# Patient Record
Sex: FEMALE | Race: WHITE
[De-identification: names, ages, dates, MRNs, and addresses within clinical notes are randomized per-mention and may not be internally consistent; named-entity substitution may affect disease eponyms.]

---
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
## 2019-06-17 ENCOUNTER — HOSPITAL ENCOUNTER (OUTPATIENT)
Dept: HOSPITAL 62 - LC | Age: 19
Discharge: HOME | End: 2019-06-17
Attending: OBSTETRICS & GYNECOLOGY
Payer: COMMERCIAL

## 2019-06-17 ENCOUNTER — HOSPITAL ENCOUNTER (EMERGENCY)
Dept: HOSPITAL 62 - ER | Age: 19
Discharge: STILL ADMITTED OR ADMINISTRATIVE | End: 2019-06-17
Payer: COMMERCIAL

## 2019-06-17 VITALS — SYSTOLIC BLOOD PRESSURE: 116 MMHG | DIASTOLIC BLOOD PRESSURE: 54 MMHG

## 2019-06-17 DIAGNOSIS — M54.5: ICD-10-CM

## 2019-06-17 DIAGNOSIS — O99.283: ICD-10-CM

## 2019-06-17 DIAGNOSIS — O21.2: Primary | ICD-10-CM

## 2019-06-17 DIAGNOSIS — Z3A.39: ICD-10-CM

## 2019-06-17 DIAGNOSIS — R10.9: ICD-10-CM

## 2019-06-17 DIAGNOSIS — O47.1: Primary | ICD-10-CM

## 2019-06-17 DIAGNOSIS — E86.0: ICD-10-CM

## 2019-06-17 DIAGNOSIS — O26.893: ICD-10-CM

## 2019-06-17 DIAGNOSIS — O99.89: ICD-10-CM

## 2019-06-17 DIAGNOSIS — Z3A.38: ICD-10-CM

## 2019-06-17 LAB
ADD MANUAL DIFF: NO
ALBUMIN SERPL-MCNC: 3.8 G/DL (ref 3.5–5)
ALP SERPL-CCNC: 125 U/L (ref 38–126)
ALT SERPL-CCNC: 22 U/L (ref 9–52)
ANION GAP SERPL CALC-SCNC: 10 MMOL/L (ref 5–19)
APPEARANCE UR: (no result)
APPEARANCE UR: (no result)
APTT PPP: YELLOW S
APTT PPP: YELLOW S
AST SERPL-CCNC: 25 U/L (ref 14–36)
BARBITURATES UR QL SCN: NEGATIVE
BASOPHILS # BLD AUTO: 0 10^3/UL (ref 0–0.2)
BASOPHILS NFR BLD AUTO: 0.3 % (ref 0–2)
BILIRUB DIRECT SERPL-MCNC: 0.2 MG/DL (ref 0–0.4)
BILIRUB SERPL-MCNC: 0.5 MG/DL (ref 0.2–1.3)
BILIRUB UR QL STRIP: NEGATIVE
BILIRUB UR QL STRIP: NEGATIVE
BUN SERPL-MCNC: 11 MG/DL (ref 7–20)
CALCIUM: 9.4 MG/DL (ref 8.4–10.2)
CHLORIDE SERPL-SCNC: 105 MMOL/L (ref 98–107)
CO2 SERPL-SCNC: 21 MMOL/L (ref 22–30)
EOSINOPHIL # BLD AUTO: 0 10^3/UL (ref 0–0.6)
EOSINOPHIL NFR BLD AUTO: 0.1 % (ref 0–6)
ERYTHROCYTE [DISTWIDTH] IN BLOOD BY AUTOMATED COUNT: 17 % (ref 11.5–14)
GLUCOSE SERPL-MCNC: 64 MG/DL (ref 75–110)
GLUCOSE UR STRIP-MCNC: NEGATIVE MG/DL
GLUCOSE UR STRIP-MCNC: NEGATIVE MG/DL
HCT VFR BLD CALC: 26.9 % (ref 36–47)
HGB BLD-MCNC: 8.5 G/DL (ref 12–15.5)
KETONES UR STRIP-MCNC: 80 MG/DL
KETONES UR STRIP-MCNC: 80 MG/DL
LIPASE SERPL-CCNC: 108.2 U/L (ref 23–300)
LYMPHOCYTES # BLD AUTO: 1 10^3/UL (ref 0.5–4.7)
LYMPHOCYTES NFR BLD AUTO: 8.4 % (ref 13–45)
MCH RBC QN AUTO: 21.7 PG (ref 27–33.4)
MCHC RBC AUTO-ENTMCNC: 31.5 G/DL (ref 32–36)
MCV RBC AUTO: 69 FL (ref 80–97)
METHADONE UR QL SCN: NEGATIVE
MONOCYTES # BLD AUTO: 0.7 10^3/UL (ref 0.1–1.4)
MONOCYTES NFR BLD AUTO: 6 % (ref 3–13)
NEUTROPHILS # BLD AUTO: 9.7 10^3/UL (ref 1.7–8.2)
NEUTS SEG NFR BLD AUTO: 85.2 % (ref 42–78)
NITRITE UR QL STRIP: NEGATIVE
NITRITE UR QL STRIP: NEGATIVE
PCP UR QL SCN: NEGATIVE
PH UR STRIP: 5 [PH] (ref 5–9)
PH UR STRIP: 5 [PH] (ref 5–9)
PLATELET # BLD: 335 10^3/UL (ref 150–450)
POTASSIUM SERPL-SCNC: 4.1 MMOL/L (ref 3.6–5)
PROT SERPL-MCNC: 6.7 G/DL (ref 6.3–8.2)
PROT UR STRIP-MCNC: NEGATIVE MG/DL
PROT UR STRIP-MCNC: NEGATIVE MG/DL
RBC # BLD AUTO: 3.91 10^6/UL (ref 3.72–5.28)
SODIUM SERPL-SCNC: 135.9 MMOL/L (ref 137–145)
SP GR UR STRIP: 1.02
SP GR UR STRIP: 1.03
TOTAL CELLS COUNTED % (AUTO): 100 %
URINE AMPHETAMINES SCREEN: NEGATIVE
URINE BENZODIAZEPINES SCREEN: NEGATIVE
URINE COCAINE SCREEN: NEGATIVE
URINE MARIJUANA (THC) SCREEN: NEGATIVE
UROBILINOGEN UR-MCNC: NEGATIVE MG/DL (ref ?–2)
UROBILINOGEN UR-MCNC: NEGATIVE MG/DL (ref ?–2)
WBC # BLD AUTO: 11.4 10^3/UL (ref 4–10.5)

## 2019-06-17 PROCEDURE — 96374 THER/PROPH/DIAG INJ IV PUSH: CPT

## 2019-06-17 PROCEDURE — 36415 COLL VENOUS BLD VENIPUNCTURE: CPT

## 2019-06-17 PROCEDURE — 81001 URINALYSIS AUTO W/SCOPE: CPT

## 2019-06-17 PROCEDURE — 85025 COMPLETE CBC W/AUTO DIFF WBC: CPT

## 2019-06-17 PROCEDURE — 80053 COMPREHEN METABOLIC PANEL: CPT

## 2019-06-17 PROCEDURE — 99284 EMERGENCY DEPT VISIT MOD MDM: CPT

## 2019-06-17 PROCEDURE — 83690 ASSAY OF LIPASE: CPT

## 2019-06-17 PROCEDURE — 4A1HXCZ MONITORING OF PRODUCTS OF CONCEPTION, CARDIAC RATE, EXTERNAL APPROACH: ICD-10-PCS | Performed by: OBSTETRICS & GYNECOLOGY

## 2019-06-17 PROCEDURE — 59025 FETAL NON-STRESS TEST: CPT

## 2019-06-17 PROCEDURE — 80307 DRUG TEST PRSMV CHEM ANLYZR: CPT

## 2019-06-17 PROCEDURE — 96361 HYDRATE IV INFUSION ADD-ON: CPT

## 2019-06-17 NOTE — ER DOCUMENT REPORT
ED General





- General


Chief Complaint: Vomiting


Stated Complaint: POSSIBLE DEHYDRATION


Time Seen by Provider: 06/17/19 12:50


Primary Care Provider: 


DELFINO BORJA MD [Primary Care Provider] - Follow up as needed


TRAVEL OUTSIDE OF THE [place] IN LAST 30 DAYS: No





- HPI


Notes: 





19-year-old female to the emergency department with complaints of nausea and 

vomiting that started this morning and low back pain and abdominal pain 

consistent with contractions that began last night.  She states that she is 39 

weeks pregnant with her second child.  States the contractions, every 10 minutes

and last anywhere from 45 to 60 seconds.  Denies any fevers, chills, diarrhea, 

headache, chest pain, shortness of breath.  [facility].  She has not had any bloody discharge since her 

symptoms began.





- Related Data


Allergies/Adverse Reactions: 


                                        





No Known Allergies Allergy (Verified 06/17/19 14:54)


   











Past Medical History





- General


Information source: Patient





- Social History


Smoking Status: Never Smoker


Frequency of alcohol use: None


Drug Abuse: None


Family History: Reviewed & Not Pertinent


Patient has suicidal ideation: No


Patient has homicidal ideation: No


Renal/ Medical History: Denies: Hx Peritoneal Dialysis


Past Surgical History: Reports: Hx Oral Surgery - wisdom teeth





Review of Systems





- Review of Systems


Constitutional: denies: Chills, Fever


EENT: No symptoms reported


Cardiovascular: denies: Chest pain, Palpitations, Heart racing, Syncope, 

Dizziness, Lightheaded


Respiratory: denies: Cough, Short of breath


Gastrointestinal: Abdominal pain, Nausea, Vomiting.  denies: Diarrhea


Musculoskeletal: No symptoms reported


Skin: No symptoms reported


Neurological/Psychological: denies: Weakness, Numbness


-: Yes All other systems reviewed and negative





Physical Exam





- Vital signs


Vitals: 


                                        











Temp Pulse Resp BP Pulse Ox


 


 98.3 F   103 H  18   116/54 L  100 


 


 06/17/19 12:15  06/17/19 12:15  06/17/19 12:15  06/17/19 12:15  06/17/19 12:15











Interpretation: Tachycardic





- General


General appearance: Appears well


In distress: None





- HEENT


Head: Normocephalic, Atraumatic





- Respiratory


Respiratory status: No respiratory distress


Chest status: Nontender


Breath sounds: Normal


Chest palpation: Normal





- Cardiovascular


Rhythm: Regular


Heart sounds: Normal auscultation


Murmur: No





- Abdominal


Inspection: Gravid female - Gravid abdomen with fundus well above the umbilicus 

consistent with 39 weeks.  Not currently teresa.  Reports last contraction 

while in waiting room.


Bowel sounds: Normal


Tenderness: Nontender





- Back


Back: Normal, Nontender





- Neurological


Neuro grossly intact: Yes


Cognition: Normal


Orientation: AAOx4


[place] Coma Scale Eye Opening: Spontaneous


[place] Coma Scale Verbal: Oriented


[place] Coma Scale Motor: Obeys Commands


[place] Coma Scale Total: 15


Speech: Normal


Motor strength normal: LUE, RUE, LLE, RLE


Sensory: Normal





- Psychological


Associated symptoms: Normal affect, Normal mood





- Skin


Skin Temperature: Warm


Skin Moisture: Dry


Skin Color: Normal





Course





- Vital Signs


Vital signs: 


                                        











Temp Pulse Resp BP Pulse Ox


 


 98.3 F   103 H  18   116/54 L  100 


 


 06/17/19 12:15  06/17/19 12:15  06/17/19 12:15  06/17/19 12:15  06/17/19 12:15














- Laboratory


Result Diagrams: 


                                 06/17/19 13:47





                                 06/17/19 13:49


Laboratory results interpreted by me: 


                                        











  06/17/19 06/17/19 06/17/19





  13:47 13:49 13:49


 


WBC  11.4 H  


 


Hgb  8.5 L  


 


Hct  26.9 L  


 


MCV  69 L  


 


MCH  21.7 L  


 


MCHC  31.5 L  


 


RDW  17.0 H  


 


Seg Neutrophils %  85.2 H  


 


Lymphocytes %  8.4 L  


 


Absolute Neutrophils  9.7 H  


 


Sodium   135.9 L 


 


Carbon Dioxide   21 L 


 


Creatinine   0.46 L 


 


Glucose   64 L 


 


Urine Ketones    80 H














- Transfer of Care


Notes: 





06/17/19 14:16


discussed patient with Dr. Wharton.  She agrees that patient that OB should be 

called as patient with 39 weeks pregnancy with nausea vomiting and symptoms 

consistent with contractions should go to labor and delivery.


Dr. Taylor, OB/GYN on-call.  She agrees that patient should come to labor and 

delivery.


Spoke with ivonne Baker RN in labor and delivery.  Explained to her patient's 

symptoms and discussion with Dr. Taylor.  She where the patient will be sent 

to labor and delivery.


06/17/19 IMpression:  Nausea, vomiting, abdominal pain in 39 week pregnant 

patient.  Will send to L and D for further OB care. 





Discharge





- Discharge


Clinical Impression: 


 Abdominal pain, 39 weeks gestation of pregnancy, Vomiting





Disposition: LABOR CHECK


Additional Instructions: 


proceed directly to labor and delivery for further management. 


Referrals: 


DELFINO BORJA MD [Primary Care Provider] - Follow up as needed

## 2019-06-17 NOTE — NON STRESS TEST REPORT
=================================================================

Non Stress Test

=================================================================

Datetime Report Generated by CPN: 06/17/2019 16:48

   

   

=================================================================

DEMOGRAPHIC

=================================================================

   

EGA NST:  38.5

   

=================================================================

INDICATION

=================================================================

   

Indication for Study:  Ordered by Provider

   

=================================================================

MONITORING

=================================================================

   

Monitor Explained:  Monitor Explained; Test Explained; Patient

   Verbalized Understanding

Time on Monitor:  06/17/2019 14:30

Time off Monitor:  06/17/2019 16:21

NST Duration:  111

   

=================================================================

NST INTERVENTIONS

=================================================================

   

NST Interventions:  PO Hydration; IV Fluids

Physician Notified NST:  Dr. Brandon

BABY A:  [id number]

   

=================================================================

BABY A

=================================================================

   

Fetal Movement :  Present

Contraction Frequency :  irregular

FHR Baseline :  130

Accelerations :  15X15

Decelerations :  None

Variability :  Moderate 6-25bpm

NST Review:  Meets Criteria for Reactive NST

NST Review and Verified By :  RUPINDER MONTANO Results:  Reactive

   

=================================================================

NST REPORT

=================================================================

   

Report Trigger:  Send Report

## 2019-06-17 NOTE — ER DOCUMENT REPORT
ED Medical Screen (RME)





- General


Chief Complaint: Vomiting


Stated Complaint: POSSIBLE DEHYDRATION


Time Seen by Provider: 06/17/19 12:50


Primary Care Provider: 


DELFINO BORJA MD [Primary Care Provider] - Follow up as needed


TRAVEL OUTSIDE OF THE [place] IN LAST 30 DAYS: No





- HPI


Notes: 





06/17/19 12:55


Patient is a 19-year-old female is 39 weeks pregnant who presents complaining of

nausea and vomiting that began this morning.  Patient states that she cannot 

keep anything down.  Patient states that she also started having light 

contractions yesterday with last episode being here in our waiting room.  She 

was told by the [facility] that they would come down and do an NST 

here.  She is otherwise urinating normally and having normal bowel movements.  

Denies drug allergies.  Denies HA, fever, neck pain, URI, CP, SOB, dysuria, or 

rash.





I have treated and performed a rapid initial assessment of this patient.  A 

comprehensive ED assessment and evaluation of the patient, analysis of test 

results and completion of medical decision making process will be conducted by 

additional ED providers.





PHYSICAL EXAMINATION:





GENERAL: Well-appearing, well-nourished and in no acute distress.  A&Ox4.  

Answers questions appropriately.





LUNGS: Breath sounds clear to auscultation bilaterally and equal.  No wheezes 

rales or rhonchi.





HEART: Regular rate and rhythm without murmurs, rubs, gallops.





ABDOMEN: Soft, gravid abdomen.  No guarding, no rebound.  Normal bowel sounds 

present.  No CVA tenderness bilaterally (cannot elicit thorough abd exam w/o 

bed, however).











- Related Data


Allergies/Adverse Reactions: 


                                        





No Known Allergies Allergy (Verified 08/15/17 21:31)


   











Physical Exam





- Vital signs


Vitals: 





                                        











Temp Pulse Resp BP Pulse Ox


 


 98.3 F   103 H  18   116/54 L  100 


 


 06/17/19 12:15  06/17/19 12:15  06/17/19 12:15  06/17/19 12:15  06/17/19 12:15














Course





- Vital Signs


Vital signs: 





                                        











Temp Pulse Resp BP Pulse Ox


 


 98.3 F   103 H  18   116/54 L  100 


 


 06/17/19 12:15  06/17/19 12:15  06/17/19 12:15  06/17/19 12:15  06/17/19 12:15














Doctor's Discharge





- Discharge


Referrals: 


DELFINO BORJA MD [Primary Care Provider] - Follow up as needed

## 2019-06-21 ENCOUNTER — HOSPITAL ENCOUNTER (INPATIENT)
Dept: HOSPITAL 62 - LR | Age: 19
LOS: 2 days | Discharge: HOME | End: 2019-06-23
Attending: OBSTETRICS & GYNECOLOGY | Admitting: OBSTETRICS & GYNECOLOGY
Payer: COMMERCIAL

## 2019-06-21 DIAGNOSIS — D50.9: ICD-10-CM

## 2019-06-21 DIAGNOSIS — Z3A.39: ICD-10-CM

## 2019-06-21 DIAGNOSIS — E05.90: ICD-10-CM

## 2019-06-21 LAB
ADD MANUAL DIFF: NO
APPEARANCE UR: (no result)
APTT PPP: YELLOW S
BARBITURATES UR QL SCN: NEGATIVE
BASOPHILS # BLD AUTO: 0 10^3/UL (ref 0–0.2)
BASOPHILS NFR BLD AUTO: 0.2 % (ref 0–2)
BILIRUB UR QL STRIP: NEGATIVE
EOSINOPHIL # BLD AUTO: 0 10^3/UL (ref 0–0.6)
EOSINOPHIL NFR BLD AUTO: 0.2 % (ref 0–6)
ERYTHROCYTE [DISTWIDTH] IN BLOOD BY AUTOMATED COUNT: 17.6 % (ref 11.5–14)
GLUCOSE UR STRIP-MCNC: NEGATIVE MG/DL
HCT VFR BLD CALC: 24.9 % (ref 36–47)
HGB BLD-MCNC: 7.9 G/DL (ref 12–15.5)
KETONES UR STRIP-MCNC: NEGATIVE MG/DL
LYMPHOCYTES # BLD AUTO: 1.6 10^3/UL (ref 0.5–4.7)
LYMPHOCYTES NFR BLD AUTO: 24.3 % (ref 13–45)
MCH RBC QN AUTO: 22.1 PG (ref 27–33.4)
MCHC RBC AUTO-ENTMCNC: 31.9 G/DL (ref 32–36)
MCV RBC AUTO: 69 FL (ref 80–97)
METHADONE UR QL SCN: NEGATIVE
MONOCYTES # BLD AUTO: 0.7 10^3/UL (ref 0.1–1.4)
MONOCYTES NFR BLD AUTO: 10.5 % (ref 3–13)
NEUTROPHILS # BLD AUTO: 4.3 10^3/UL (ref 1.7–8.2)
NEUTS SEG NFR BLD AUTO: 64.8 % (ref 42–78)
NITRITE UR QL STRIP: NEGATIVE
PCP UR QL SCN: NEGATIVE
PH UR STRIP: 5 [PH] (ref 5–9)
PLATELET # BLD: 351 10^3/UL (ref 150–450)
PROT UR STRIP-MCNC: 30 MG/DL
RBC # BLD AUTO: 3.6 10^6/UL (ref 3.72–5.28)
SP GR UR STRIP: 1.03
TOTAL CELLS COUNTED % (AUTO): 100 %
URINE AMPHETAMINES SCREEN: NEGATIVE
URINE BENZODIAZEPINES SCREEN: NEGATIVE
URINE COCAINE SCREEN: NEGATIVE
URINE MARIJUANA (THC) SCREEN: NEGATIVE
UROBILINOGEN UR-MCNC: 4 MG/DL (ref ?–2)
WBC # BLD AUTO: 6.7 10^3/UL (ref 4–10.5)

## 2019-06-21 PROCEDURE — 0HQ9XZZ REPAIR PERINEUM SKIN, EXTERNAL APPROACH: ICD-10-PCS | Performed by: OBSTETRICS & GYNECOLOGY

## 2019-06-21 PROCEDURE — 86900 BLOOD TYPING SEROLOGIC ABO: CPT

## 2019-06-21 PROCEDURE — 80307 DRUG TEST PRSMV CHEM ANLYZR: CPT

## 2019-06-21 PROCEDURE — 86901 BLOOD TYPING SEROLOGIC RH(D): CPT

## 2019-06-21 PROCEDURE — 85027 COMPLETE CBC AUTOMATED: CPT

## 2019-06-21 PROCEDURE — 36415 COLL VENOUS BLD VENIPUNCTURE: CPT

## 2019-06-21 PROCEDURE — 81005 URINALYSIS: CPT

## 2019-06-21 PROCEDURE — 85025 COMPLETE CBC W/AUTO DIFF WBC: CPT

## 2019-06-21 PROCEDURE — 86850 RBC ANTIBODY SCREEN: CPT

## 2019-06-21 PROCEDURE — 86592 SYPHILIS TEST NON-TREP QUAL: CPT

## 2019-06-21 RX ADMIN — SODIUM CHLORIDE, SODIUM LACTATE, POTASSIUM CHLORIDE, AND CALCIUM CHLORIDE PRN MLS/HR: .6; .31; .03; .02 INJECTION, SOLUTION INTRAVENOUS at 12:54

## 2019-06-21 RX ADMIN — SODIUM CHLORIDE, SODIUM LACTATE, POTASSIUM CHLORIDE, AND CALCIUM CHLORIDE PRN MLS/HR: .6; .31; .03; .02 INJECTION, SOLUTION INTRAVENOUS at 13:41

## 2019-06-21 RX ADMIN — SODIUM CHLORIDE, SODIUM LACTATE, POTASSIUM CHLORIDE, AND CALCIUM CHLORIDE PRN MLS/HR: .6; .31; .03; .02 INJECTION, SOLUTION INTRAVENOUS at 07:30

## 2019-06-21 RX ADMIN — DOCUSATE SODIUM SCH MG: 100 CAPSULE, LIQUID FILLED ORAL at 18:29

## 2019-06-21 RX ADMIN — FERROUS SULFATE TAB 325 MG (65 MG ELEMENTAL FE) SCH MG: 325 (65 FE) TAB at 18:29

## 2019-06-21 RX ADMIN — IBUPROFEN SCH MG: 800 TABLET, FILM COATED ORAL at 17:23

## 2019-06-21 RX ADMIN — SODIUM CHLORIDE, SODIUM LACTATE, POTASSIUM CHLORIDE, AND CALCIUM CHLORIDE PRN MLS/HR: .6; .31; .03; .02 INJECTION, SOLUTION INTRAVENOUS at 12:02

## 2019-06-21 NOTE — WARNING SIGNS IN BABIES
=================================================================

VOD Warning Signs

=================================================================

Datetime Report Generated by N: 06/21/2019 08:28

   

VOD#608 -Warning Signs in Babies:  Viewed with Parent(s)/Family   

   (06/21/2019 08:28:Tip Weathers RN)

## 2019-06-21 NOTE — DELIVERY SUMMARY
=================================================================

Del Sum A-C

=================================================================

Datetime Report Generated by CPN: 2019 18:56

   

   

=================================================================

DELIVERY PERSONNEL

=================================================================

   

DELIVERY PERSONNEL:  [id number]

Delivery Doctor::  Ayde Reyes CNM

Labor and Delivery Nurse::  Tip Weathers RN

Labor and Delivery Nurse::  CHIP Perdomo

Student Observers::  BRIDGETTE LuevanoJaylansteve Crawford Tech/CNA:  Colleen Hawk CNA II

   

=================================================================

MATERNAL INFORMATION

=================================================================

   

Delivery Anesthesia:  Epidural

Medications After Delivery:  Pitocin Bolus-Please Comment

Meds After Delivery Comment:  Pitocin 20 units in NS

Delivery QBL:  100

Maternal Complications:  None

Provider Comments:   of VMI, LENNOX, delivered in stable condition,

   placed on pts abdoman, crying. Cord clamped after 90 seconds.  Cord

   cut, cord blood obtained. Placenta S/C/I, ff w/ decreased lochia. 

   IV Pitocin infusing, 200 mcg SL Cytotec given for PPH prophylaxis. 

   1st degree laceration repaired. Apgars 9,9.   ml.  Pt and

   baby in stable condition, skin to skin. Pt plans to breastfeed.

   Attending MD is Dr George

   

=================================================================

LABOR SUMMARY

=================================================================

   

EDC:  2019 00:00

No. Babies in Womb:  1

 Attempted:  No

Labor Anesthesia:  Epidural

   

=================================================================

LABOR INFORMATION

=================================================================

   

Reason for Induction:  Other

Reason for Induction- Other:  Prodromal labor

Onset of Labor:  2019 12:20

Complete Dilatation:  2019 15:35

Cervical Ripening Agents:  Cervidil; Cytotec @ 200mcg (Annotations: 1

   tab po)

Oxytocin:  Induction

Group B Beta Strep:  negative

Antibiotics # of Doses:  0

Steroids Given:  None

Reason Steroids Not Administered:  Not Applicable

   

=================================================================

MEMBRANES

=================================================================

   

Membranes Rupture Method:  Artificial

Rupture of Membranes:  2019 12:20

Length of Rupture (hr):  3.37

Amniotic Fluid Color:  Clear

Amniotic Fluid Amount:  Moderate

Amniotic Fluid Odor:  Normal

   

=================================================================

STAGES OF LABOR

=================================================================

   

Stage 1 hr:  3

Stage 1 min:  15

Stage 2 hr:  0

Stage 2 min:  7

   

=================================================================

VAGINAL DELIVERY

=================================================================

   

Episiotomy:  None

Laceration #1:  Perineal

Laceration Extension #1:  First Degree

Laceration Repair:  Yes

Laceration Repair Note:  1st degree laceration repaired using 3.0

   vicryl on CT needle, 1% lidocaine local injection used to numb pts

   perineal area.  

Sponge Count Correct:  Yes

Sharps Count Correct:  Yes

   

=================================================================

CSECTION DELIVERY

=================================================================

   

Primary Indication:  N/A

Secondary Indication:  N/A

CSection Incidence:  N/A

Labor:  N/A

Elective:  N/A

   

=================================================================

BABY A INFORMATION

=================================================================

   

Infant Delivery Date/Time:  2019 15:42

Method of Delivery:  Vaginal

Born in Route :  No

:  N/A

Forceps:  N/A

Vacuum Extraction:  N/A

Shoulder Dystocia :  No

   

=================================================================

PRESENTATION/POSITION BABY A

=================================================================

   

Presentation:  Cephalic

Cephalic Presentation:  Vertex

Vertex Position:  Left Occipital Anterior

Breech Presentation:  N/A

   

=================================================================

PLACENTA INFORMATION BABY A

=================================================================

   

Placenta Method of Delivery:  Spontaneous

Placenta Status:  Delivered

   

=================================================================

APGAR SCORES BABY A

=================================================================

   

Heart Rate 1 min:  >100 bpm

Resp Effort 1 min:  Good Cry

Reflex Irritability 1 min:  Cough or Sneeze or Pulls Away

Muscle Tone 1 min:  Active Motion

Color 1 min:  Body Pink, Extremities Blue

APGAR SCORE 1 MIN:  9

Heart Rate 5 min:  >100 bpm

Resp Effort 5 min:  Good Cry

Reflex Irritability 5 min:  Cough or Sneeze or Pulls Away

Muscle Tone 5 min:  Active Motion

Color 5 min:  Body Pink, Extremities Blue

APGAR SCORE 5 MIN:  9

   

=================================================================

INFANT INFORMATION BABY A

=================================================================

   

Gestational Age at Delivery:  39.2

Gestational Status:  Full Term- 39- 40.6 Weeks

Infant Outcome :  Liveborn

Infant Condition :  Stable

Infant Sex:  Male

   

=================================================================

IDENTIFICATION BABY A

=================================================================

   

Infant Verification Date/Time:  2019 15:53

ID Band Number:  [id number]

Mother's Name Verified:  Yes

Infant Medical Record Number:  [id number]

RN Verifying Infant:  T. Jasiel, RN and D. Bellavance

   

=================================================================

WEIGHT/LENGTH BABY A

=================================================================

   

Infant Birthweight (gm):  4402

Infant Weight (lb):  9

Infant Weight (oz):  11

Infant Length (in):  20.50

Infant Length (cm):  52.07

   

=================================================================

CORD INFORMATION BABY A

=================================================================

   

No. Cord Vessels:  3

Nuchal Cord :  N/A

Cord Blood Taken:  Yes-For Eval (Mom's Blood Type - or O+)

Infant Suction:  None

   

=================================================================

ASSESSMENT BABY A

=================================================================

   

Infant Complications:  None

Physical Findings at Delivery:  Within Normal Limits

Infant Respirations:  Appears Normal

Skin to Skin:  Yes

Infant Care By:  BEKA Ruth RN

   

=================================================================

SIGNATURES

=================================================================

   

Assignment:  Rossy George MD

Signature:  Electronically signed by Ayde Reyes CNM on 2019

   at 16:20  with User ID: [email or web address]

:  Electronically signed by Ayde Reyes CNM on 2019 at 16:20 

   with User ID: [email or web address]

## 2019-06-21 NOTE — ADMISSION PHYSICAL
=================================================================



=================================================================

Datetime Report Generated by CPN: 2019 08:07

   

   

=================================================================

CURRENT ADMISSION

=================================================================

   

Chief Complaint:  Other

Chief Complaint Other:  Here due to Prodromal labor at 39 wks.

Admit Impression :  Term, Intrauterine Pregnancy; Intact Membranes

Admit Impression- Other:  Labor Augmentation

Admit Plan:  Initiate Labor Protocol; Initiate Labor Augmentation

   Protocol

Admit Plan- Other:  Plan Pitocin. Pt desires an epidural when in active

   labor

   

=================================================================

ALLERGIES

=================================================================

   

Medication Allergies:  No

Medication Allergies:  No Known Allergies (2019)

Latex:  No Latex Allergies

Food Allergies:  None

Environmental Allergies:  None

   

=================================================================

OBSTETRICAL HISTORY

=================================================================

   

EDC:  2019 00:00

:  2

Para:  1

Livin

Gestational Diabetes:  No

Rh Sensitization:  No

Incompetent Cervix:  No

ISAMAR:  No

Infertility:  No

ART Treatment:  No

Uterine Anomaly:  No

IUGR:  No

Hx Previous C/S:  No

Macrosomia:  Unknown

Hx Loss/Stillborn:  No

PIH:  No

Hx  Death:  No

Placenta Previa/Abruption:  No

Depression/PP Depression:  No

PTL/PROM:  No

Post Partum Hemorrhage:  No

Current Pregnancy Procedures:  Ultrasound; NST

Obstetrical History Comments:  G1- ,39 weeks  male 8lbs 1 oz,

   second degree tear

   G2- Current

   

=================================================================

***SEE PRENATAL RECORDS***

=================================================================

   

Alcohol:  No

Marijuana :  No

Cocaine:  No

Other Illicit Drugs:  No

Cigarettes:  Current Some Day Smoker. [id number]

   

=================================================================

MEDICAL HISTORY

=================================================================

   

Diabetes:  No

Blood Transfusion:  No

Pulmonary Disease (Asthma, TB):  No

Breast Disease:  No

Hypertension:  No

Gyn Surgery:  No

Heart Disease:  No

Hosp/Surgery:  No

Autoimmune Disorder:  No

Anesthetic Complications:  No

Kidney Disease:  No

Abnormal Pap Smear:  No

Neuro/Epilepsy:  No

Psychiatric Disorders:  No

Other Medical Diseases:  No

Hepatitis/Liver Disease:  No

Significant Family History:  No

Varicosities/Phlebitis:  No

Trauma/Violence :  No

Thyroid Dysfunction:  No

Medical History Comments:  hyperthyroidism noted with NOB labs, Low B12

   

=================================================================

INFECTIOUS HISTORY

=================================================================

   

Gonorrhea:  No

Genital Herpes:  No

Chlamydia:  No

Tuberculosis:  No

Syphilis:  No

Hepatitis:  No

HIV/AIDS Exposure:  No

Rash or Viral Illness:  No

HPV:  No

   

=================================================================

PHYSICAL EXAM

=================================================================

   

General:  Normal

HEENT:  Normal

Neurologic:  Normal

Thyroid:  Normal

Heart:  Normal

Lungs:  Normal

Breast:  Normal

Back:  Normal

Abdomen:  Normal

Genitourinary Exam:  Normal

Extremities:  Normal

DTRs:  Normal

Pelvic Type:  Adequate

Vital Signs:  Reviewed; Within Normal Limits

   

=================================================================

MEMBRANES

=================================================================

   

Membranes:  Intact

   

=================================================================

FETUS A

=================================================================

   

EGA:  39.2

Monitoring:  External US

FHR- Baseline:  140

Variability:  Moderate 6-25bpm

Accelerations:  15X15

Decelerations:  None

Admit Comment:   at 39 .2 wks,  VE per RN, pt still 3 cm.  Will

   start Pitocin to augment. EFW on Leapold's 8 lbs.  GBS negative.

   Plan AROM once making cervical change.  Attending MD is Dr George

   

=================================================================

PLANS FOR LABOR AND DELIVERY

=================================================================

   

Labor and Delivery:  None

Pain Management:  Epidural

Feeding Preference:  Breast

Benefit of Breast Feed Discussed:  Yes

Circumcision:  Yes

   

=================================================================

INFORMED CONSENT

=================================================================

   

Assignment:  Rossy George MD

Signature:  Electronically signed by Ayde Reyes CNM on 2019

   at 08:06  with User ID: [email or web address]

:  Electronically signed by Ayde Reyes CNM on 2019 at 08:06 

   with User ID: [email or web address]

## 2019-06-22 LAB
ERYTHROCYTE [DISTWIDTH] IN BLOOD BY AUTOMATED COUNT: 17.9 % (ref 11.5–14)
HCT VFR BLD CALC: 25.8 % (ref 36–47)
HGB BLD-MCNC: 8.2 G/DL (ref 12–15.5)
MCH RBC QN AUTO: 22 PG (ref 27–33.4)
MCHC RBC AUTO-ENTMCNC: 31.8 G/DL (ref 32–36)
MCV RBC AUTO: 69 FL (ref 80–97)
PLATELET # BLD: 262 10^3/UL (ref 150–450)
RBC # BLD AUTO: 3.72 10^6/UL (ref 3.72–5.28)
WBC # BLD AUTO: 9 10^3/UL (ref 4–10.5)

## 2019-06-22 RX ADMIN — Medication SCH CAP: at 10:13

## 2019-06-22 RX ADMIN — FERROUS SULFATE TAB 325 MG (65 MG ELEMENTAL FE) SCH MG: 325 (65 FE) TAB at 17:41

## 2019-06-22 RX ADMIN — IBUPROFEN SCH MG: 800 TABLET, FILM COATED ORAL at 13:15

## 2019-06-22 RX ADMIN — DOCUSATE SODIUM SCH MG: 100 CAPSULE, LIQUID FILLED ORAL at 10:13

## 2019-06-22 RX ADMIN — IBUPROFEN SCH MG: 800 TABLET, FILM COATED ORAL at 22:54

## 2019-06-22 RX ADMIN — FERROUS SULFATE TAB 325 MG (65 MG ELEMENTAL FE) SCH MG: 325 (65 FE) TAB at 10:13

## 2019-06-22 RX ADMIN — SENNOSIDES, DOCUSATE SODIUM SCH EACH: 50; 8.6 TABLET, FILM COATED ORAL at 10:13

## 2019-06-22 RX ADMIN — IBUPROFEN SCH: 800 TABLET, FILM COATED ORAL at 10:05

## 2019-06-22 RX ADMIN — DOCUSATE SODIUM SCH MG: 100 CAPSULE, LIQUID FILLED ORAL at 17:41

## 2019-06-22 RX ADMIN — IBUPROFEN SCH MG: 800 TABLET, FILM COATED ORAL at 13:13

## 2019-06-22 NOTE — PDOC PROGRESS REPORT
Subjective-OB


Progress Note for:: 06/22/19 - PPDay #1, doing well, denies dizziness, SOB. 

breastfeeding





Physical Exam (OB)


Vital Signs: 


                                 Intake & Output











 06/21/19 06/22/19 06/23/19





 06:59 06:59 06:59


 


Intake Total  773 


 


Balance  773 


 


Weight 73.3 kg  














- General


General Appearance: Appears well, Alert


In distress: None





- Lochia


Lochia Amount: Small 10-25 ml


Lochia Color: Rubra/Red





- Abdomen


Fundal Description: Firm


Fundal Height: u/u - u/2





- Respiratory


Respiratory Status: No respiratory distress





- Abdominal


Distension: No distension


Tenderness: Nontender





- Genitourinary


Genitourinary Note: 





voiding





- Extremities


Upper extremity: Normal inspection


Lower extremities: Normal inspection





- Neurological


Cognition: Normal


Orientation: AAOx4


Speech: Normal





- Psychological


Associated symptoms: Normal affect, Normal mood





- Skin


Skin Temperature: Warm


Skin Moisture: Dry





Objective-Diagnostic


Laboratory: 


                                        





                                 06/22/19 06:45 





                                        











  06/22/19





  06:45


 


WBC  9.0


 


RBC  3.72


 


Hgb  8.2 L


 


Hct  25.8 L


 


MCV  69 L


 


MCH  22.0 L


 


MCHC  31.8 L


 


RDW  17.9 H


 


Plt Count  262














Assessment and Plan(PN)





- Assessment and Plan


(1) Iron deficiency anemia during pregnancy


Is this a current diagnosis for this admission?: Yes   





(2) 39 weeks gestation of pregnancy


Is this a current diagnosis for this admission?: Yes   





(3) Vaginal delivery


Is this a current diagnosis for this admission?: Yes   





- Time Spent with Patient


Time with patient: Less than 15 minutes


Medications reviewed and adjusted accordingly: Yes





- Disposition


Anticipated Discharge: Home


Within: within 24 hours

## 2019-06-23 VITALS — DIASTOLIC BLOOD PRESSURE: 66 MMHG | SYSTOLIC BLOOD PRESSURE: 121 MMHG

## 2019-06-23 RX ADMIN — Medication SCH CAP: at 10:16

## 2019-06-23 RX ADMIN — FERROUS SULFATE TAB 325 MG (65 MG ELEMENTAL FE) SCH MG: 325 (65 FE) TAB at 10:16

## 2019-06-23 RX ADMIN — DOCUSATE SODIUM SCH MG: 100 CAPSULE, LIQUID FILLED ORAL at 10:16

## 2019-06-23 RX ADMIN — IBUPROFEN SCH MG: 800 TABLET, FILM COATED ORAL at 05:08

## 2019-06-23 RX ADMIN — SENNOSIDES, DOCUSATE SODIUM SCH EACH: 50; 8.6 TABLET, FILM COATED ORAL at 10:16

## 2019-06-23 NOTE — PDOC DISCHARGE SUMMARY
Final Diagnosis


Discharge Date: 06/23/19 - PP day #2, doing well, ambulating w/out difficulty, 

no c/o dizziness or SOB, 





- Final Diagnosis


(1) Iron deficiency anemia during pregnancy


Is this a current diagnosis for this admission?: Yes   





(2) 39 weeks gestation of pregnancy


Is this a current diagnosis for this admission?: Yes   





(3) Vaginal delivery


Is this a current diagnosis for this admission?: Yes   





Discharge Data





- Discharge Medication


Prescriptions: 


Ibuprofen [Motrin 800 mg Tablet] 800 mg PO Q8 #60 tablet


Home Medications: 








Prenatal Vit/Iron Fum/Folic AC [Prenatal Tablet] 1 each PO DAILY 08/15/17 


Ferrous Sulfate [Feosol 325 mg Tablet] 325 mg PO BID #60 tablet 08/18/17 


Ibuprofen [Motrin 800 mg Tablet] 800 mg PO Q8 #60 tablet 06/23/19 








Reason(s) for Admission: Onset of Labor


Prenatal Procedures: Ultrasound


Intrapartum Procedure(s): Spontaneous Vaginal Delivery


Postpartum Complication(s): Laceration-Perineal


Laceration-Degree: 1st





- Diagnosis Test


Laboratory: 


                                        











Temp Pulse Resp BP Pulse Ox


 


 97.5 F   62   16   121/66   100 


 


 06/23/19 07:42  06/23/19 07:42  06/23/19 07:42  06/23/19 07:42  06/23/19 07:42








                                        











  06/21/19 06/21/19 06/22/19





  06:37 07:10 06:45


 


RBC   3.60 L  3.72


 


Hgb   7.9 L  8.2 L


 


Hct   24.9 L  25.8 L


 


Urine Opiates Screen  NEGATIVE  














- Discharge information/Instructions


Discharge Activity: Activity As Tolerated, No Lifting Over 10 Pounds, Pelvic 

Rest


Discharge Diet: As Tolerated, Regular


Disposition: HOME, SELF-CARE


Follow up with: [facility]


in: 4, Weeks